# Patient Record
Sex: MALE | Race: WHITE | NOT HISPANIC OR LATINO
[De-identification: names, ages, dates, MRNs, and addresses within clinical notes are randomized per-mention and may not be internally consistent; named-entity substitution may affect disease eponyms.]

---

## 2023-11-13 PROBLEM — Z00.00 ENCOUNTER FOR PREVENTIVE HEALTH EXAMINATION: Status: ACTIVE | Noted: 2023-11-13

## 2024-01-03 ENCOUNTER — APPOINTMENT (OUTPATIENT)
Dept: GASTROENTEROLOGY | Facility: CLINIC | Age: 34
End: 2024-01-03
Payer: COMMERCIAL

## 2024-01-03 VITALS
RESPIRATION RATE: 14 BRPM | DIASTOLIC BLOOD PRESSURE: 84 MMHG | HEART RATE: 96 BPM | HEIGHT: 70 IN | OXYGEN SATURATION: 96 % | SYSTOLIC BLOOD PRESSURE: 142 MMHG | WEIGHT: 240 LBS | BODY MASS INDEX: 34.36 KG/M2 | TEMPERATURE: 98.6 F

## 2024-01-03 DIAGNOSIS — Z82.49 FAMILY HISTORY OF ISCHEMIC HEART DISEASE AND OTHER DISEASES OF THE CIRCULATORY SYSTEM: ICD-10-CM

## 2024-01-03 DIAGNOSIS — E83.110 HEREDITARY HEMOCHROMATOSIS: ICD-10-CM

## 2024-01-03 DIAGNOSIS — Z78.9 OTHER SPECIFIED HEALTH STATUS: ICD-10-CM

## 2024-01-03 DIAGNOSIS — Z80.0 FAMILY HISTORY OF MALIGNANT NEOPLASM OF DIGESTIVE ORGANS: ICD-10-CM

## 2024-01-03 DIAGNOSIS — R79.89 OTHER SPECIFIED ABNORMAL FINDINGS OF BLOOD CHEMISTRY: ICD-10-CM

## 2024-01-03 PROCEDURE — 99205 OFFICE O/P NEW HI 60 MIN: CPT

## 2024-01-03 NOTE — HISTORY OF PRESENT ILLNESS
[FreeTextEntry1] : A very pleasant 33 year male White  H63D heterozygote being managed by [facility] for iron overloadv by phlebotomies presents for evaluation of hepatomegaly seen on ultrasound of the liver. Iron overload diagnosis is fairly recent.  Phlebotomy was initiated in October 2023. Patient has been much bigger in the past and has managed to lose significant amounts of weight with diet and exercise so much so that he had to get abdominoplasty and thighplasty around 10 years ago or so. He takes many supplements including  razia Melgoza MD NOV 2022 VIral, autoimmune negative Hgb 15.8 plt 150 ALT 80 AST 39  Ferritin 583 TS 17% LDH RETIC NL  Labs from 11/16/2022 15.8 hemoglobin, hepatitis ABC negative hepatitis B surface antibody positive indicated in vaccination ALT 80 AST 39 bilirubin normal (normal range is 1 35-2 2 5)   October 8, 2022 [facility] Abdominal ultrasound Fatty infiltration of the liver Enlarged spleen  August 2023 [facility] patch US abdomen complete Liver enlarged at 17.5 cm previously 15.6 cm.  Echogenicity remains increased.  Patent portal vein. Spleen enlarged 14.5 cm into 13.2 cm and 5.9 cm   He has been evaluated at [facility] for thrombocytopenia and splenomegaly  Alcohol consumption. 7 months ago prior to that 1 every 6 months Denies tattoos. Denies OTC  Herbal supplements : Multiple. 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 Denies fatigue, malaise, arthralgias, myalgias, pruritus, recent infection Denies abdominal pain or distension,  Denies jaundice, hematemesis, hematochezia, dark urine, confusion, unintentional weight loss or gain.  Denies family history of sudden death or late trimester miscarriages.  Physical exam: Gen: A&Ox3, NAD HEENT: normal outer ears & nose, PERRL, mucus membranes moist, anicteric, no lymphadenopathy CVS: regular rate and rhythm, no murmur Pulm: vesicular breath sounds Abdomen: normal bowel sounds, soft, nontender, no hepatosplenomegaly Legs: no edema, no clubbing Musculoskeletal: normal gait Skin: no rash Neuro: no asterixis, EOMI Psych: normal affect

## 2024-01-03 NOTE — ASSESSMENT
[FreeTextEntry1] : Fatty liver and elevated ALT. Labs ordered to rule out various etiologies of liver disease at [facility] but mostly negative. I am reordering LFTs and ordered some other additional labs. MRI ordered to look for disease chronicity and hepatic iron. Follow-up with labs and imaging.

## 2024-01-08 ENCOUNTER — TRANSCRIPTION ENCOUNTER (OUTPATIENT)
Age: 34
End: 2024-01-08

## 2024-01-27 ENCOUNTER — LABORATORY RESULT (OUTPATIENT)
Age: 34
End: 2024-01-27

## 2024-01-27 ENCOUNTER — OUTPATIENT (OUTPATIENT)
Dept: OUTPATIENT SERVICES | Facility: HOSPITAL | Age: 34
LOS: 1 days | End: 2024-01-27

## 2024-01-27 ENCOUNTER — APPOINTMENT (OUTPATIENT)
Dept: MRI IMAGING | Facility: CLINIC | Age: 34
End: 2024-01-27
Payer: COMMERCIAL

## 2024-01-27 DIAGNOSIS — E83.110 HEREDITARY HEMOCHROMATOSIS: ICD-10-CM

## 2024-01-27 PROCEDURE — 74181 MRI ABDOMEN W/O CONTRAST: CPT | Mod: 26,52

## 2024-02-22 LAB
A1AT SERPL-MCNC: 118 MG/DL
ALBUMIN SERPL ELPH-MCNC: 4.8 G/DL
ALP BLD-CCNC: 69 U/L
ALT SERPL-CCNC: 37 U/L
ANION GAP SERPL CALC-SCNC: 11 MMOL/L
AST SERPL-CCNC: 25 U/L
BASOPHILS # BLD AUTO: 0.01 K/UL
BASOPHILS NFR BLD AUTO: 0.2 %
BILIRUB INDIRECT SERPL-MCNC: 0.3 MG/DL
BILIRUB SERPL-MCNC: 0.5 MG/DL
BUN SERPL-MCNC: 15 MG/DL
CALCIUM SERPL-MCNC: 9.5 MG/DL
CHLORIDE SERPL-SCNC: 106 MMOL/L
CO2 SERPL-SCNC: 25 MMOL/L
CREAT SERPL-MCNC: 0.87 MG/DL
EGFR: 116 ML/MIN/1.73M2
EOSINOPHIL # BLD AUTO: 0.05 K/UL
EOSINOPHIL NFR BLD AUTO: 1.1 %
GLUCOSE SERPL-MCNC: 97 MG/DL
HCT VFR BLD CALC: 46.2 %
HGB BLD-MCNC: 15.3 G/DL
IMM GRANULOCYTES NFR BLD AUTO: 0.7 %
INR PPP: 0.96 RATIO
LYMPHOCYTES # BLD AUTO: 1.5 K/UL
LYMPHOCYTES NFR BLD AUTO: 33.6 %
MAN DIFF?: NORMAL
MCHC RBC-ENTMCNC: 25.6 PG
MCHC RBC-ENTMCNC: 33.1 GM/DL
MCV RBC AUTO: 77.3 FL
MITOCHONDRIA AB SER IF-ACNC: NORMAL
MONOCYTES # BLD AUTO: 0.38 K/UL
MONOCYTES NFR BLD AUTO: 8.5 %
NEUTROPHILS # BLD AUTO: 2.5 K/UL
NEUTROPHILS NFR BLD AUTO: 55.9 %
NT-PROBNP SERPL-MCNC: <36 PG/ML
PLATELET # BLD AUTO: 157 K/UL
POTASSIUM SERPL-SCNC: 4.6 MMOL/L
PROT SERPL-MCNC: 7.1 G/DL
PT BLD: 10.9 SEC
RBC # BLD: 5.98 M/UL
RBC # FLD: 14.5 %
SODIUM SERPL-SCNC: 142 MMOL/L
TSH SERPL-ACNC: 1.14 UIU/ML
WBC # FLD AUTO: 4.47 K/UL

## 2024-03-06 ENCOUNTER — APPOINTMENT (OUTPATIENT)
Dept: GASTROENTEROLOGY | Facility: CLINIC | Age: 34
End: 2024-03-06
Payer: COMMERCIAL

## 2024-03-06 VITALS
WEIGHT: 240 LBS | DIASTOLIC BLOOD PRESSURE: 86 MMHG | HEART RATE: 90 BPM | OXYGEN SATURATION: 99 % | RESPIRATION RATE: 14 BRPM | SYSTOLIC BLOOD PRESSURE: 142 MMHG | HEIGHT: 70 IN | BODY MASS INDEX: 34.36 KG/M2 | TEMPERATURE: 98 F

## 2024-03-06 PROCEDURE — 99214 OFFICE O/P EST MOD 30 MIN: CPT

## 2024-03-06 NOTE — HISTORY OF PRESENT ILLNESS
[FreeTextEntry1] : A very pleasant 33 year male White  H63D heterozygote being managed by [facility] for iron overloadv by phlebotomies presents for evaluation of hepatomegaly seen on ultrasound of the liver.   March 6, 2024 Records reviewed, including notes, labs, imaging, etc. No new liver-related complaints like hematemesis melena jaundice. No hospitalizations or medication changes. Previsit labs and MRI reviewed with the patient. ALT continues to be mildly elevated with imaging documented fat in the liver.  Alternative causes of elevated LFTs have been ruled out confirming ELY or nonalcoholic steatohepatitis as the etiology of elevated LFTs. Patient has no clinical biochemical or radiological evidence of synthetic compromise or cirrhosis  Given his diagnosis of iron overload I ordered an MRI of the liver to evaluate hepatic iron. MRI does not show any hepatic iron. MRI also noted splenomegaly.  Patient also has a platelet of 145.  This is not from liver disease     3 January 2024 Iron overload diagnosis is fairly recent.  Phlebotomy was initiated in October 2023. Patient has been much bigger in the past and has managed to lose significant amounts of weight with diet and exercise so much so that he had to get abdominoplasty and thighplasty around 10 years ago or so. He takes many supplements including  quercentin   IRASI Melgoza MD NOV 2022 VIral, autoimmune negative Hgb 15.8 plt 150 ALT 80 AST 39  Ferritin 583 TS 17% LDH RETIC NL  Labs from 11/16/2022 15.8 hemoglobin, hepatitis ABC negative hepatitis B surface antibody positive indicated in vaccination ALT 80 AST 39 bilirubin normal (normal range is 1 35-2 2 5)   [facility] Fatty infiltration of the liver Enlarged spleen  August 2023 [facility] patch US abdomen complete Liver enlarged at 17.5 cm previously 15.6 cm.  Echogenicity remains increased.  Patent portal vein. Spleen enlarged 14.5 cm into 13.2 cm and 5.9 cm   He has been evaluated at [facility] for thrombocytopenia and splenomegaly  Alcohol consumption. 7 months ago prior to that 1 every 6 months Denies tattoos. Denies OTC  Herbal supplements : Multiple. 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 Denies fatigue, malaise, arthralgias, myalgias, pruritus, recent infection Denies abdominal pain or distension,  Denies jaundice, hematemesis, hematochezia, dark urine, confusion, unintentional weight loss or gain.  Denies family history of sudden death or late trimester miscarriages.  Physical exam: Gen: A&Ox3, NAD HEENT: normal outer ears & nose, PERRL, mucus membranes moist, anicteric, no lymphadenopathy CVS: regular rate and rhythm, no murmur Pulm: vesicular breath sounds Abdomen: normal bowel sounds, soft, nontender, no hepatosplenomegaly Legs: no edema, no clubbing Musculoskeletal: normal gait Skin: no rash Neuro: no asterixis, EOMI Psych: normal affect

## 2024-03-06 NOTE — ASSESSMENT
[FreeTextEntry1] : ELY  Labs ordered to rule out competing etiologies of liver disease negative This was was discussed with the patient in detail. Etiology and natural history of fatty liver was explained to her in detail.  Complications were discussed in detail. There is no pill for fatty liver.  The recommendations are lifestyle modifications in the form of diet and exercise.   Weight loss of upto 10% of the current body weight recommended. He wants to follow-up with us with us in a year.  FibroScan at that time.  MRI documented splenomegaly and thrombocytopenia  Unlikely from liver disease since there is no evidence of cirrhosis. 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 I have asked him to explore that with his hematologist at [facility]. We will fax over this note for them as well.  Follow-up in 1 year after weight loss